# Patient Record
Sex: MALE | Race: WHITE
[De-identification: names, ages, dates, MRNs, and addresses within clinical notes are randomized per-mention and may not be internally consistent; named-entity substitution may affect disease eponyms.]

---

## 2019-02-21 ENCOUNTER — HOSPITAL ENCOUNTER (OUTPATIENT)
Dept: HOSPITAL 62 - SC | Age: 74
Discharge: HOME | End: 2019-02-21
Attending: OPHTHALMOLOGY
Payer: MEDICARE

## 2019-02-21 DIAGNOSIS — Z79.82: ICD-10-CM

## 2019-02-21 DIAGNOSIS — E78.00: ICD-10-CM

## 2019-02-21 DIAGNOSIS — H25.813: Primary | ICD-10-CM

## 2019-02-21 DIAGNOSIS — H40.033: ICD-10-CM

## 2019-02-21 DIAGNOSIS — H57.03: ICD-10-CM

## 2019-02-21 DIAGNOSIS — I10: ICD-10-CM

## 2019-02-21 DIAGNOSIS — H52.4: ICD-10-CM

## 2019-02-21 DIAGNOSIS — H35.3131: ICD-10-CM

## 2019-02-21 PROCEDURE — 66984 XCAPSL CTRC RMVL W/O ECP: CPT

## 2019-02-21 PROCEDURE — V2632 POST CHMBR INTRAOCULAR LENS: HCPCS

## 2019-02-21 RX ADMIN — CYCLOPENTOLATE HYDROCHLORIDE AND PHENYLEPHRINE HYDROCHLORIDE PRN DROP: 2; 10 SOLUTION/ DROPS OPHTHALMIC at 07:12

## 2019-02-21 RX ADMIN — TETRACAINE HYDROCHLORIDE PRN DROP: 5 SOLUTION OPHTHALMIC at 07:26

## 2019-02-21 RX ADMIN — DORZOLAMIDE HYDROCHLORIDE AND TIMOLOL MALEATE PRN DROP: 20; 5 SOLUTION OPHTHALMIC at 07:57

## 2019-02-21 RX ADMIN — TETRACAINE HYDROCHLORIDE PRN DROP: 5 SOLUTION OPHTHALMIC at 07:31

## 2019-02-21 RX ADMIN — TROPICAMIDE PRN DROP: 10 SOLUTION/ DROPS OPHTHALMIC at 07:21

## 2019-02-21 RX ADMIN — BESIFLOXACIN PRN DROP: 6 SUSPENSION OPHTHALMIC at 00:00

## 2019-02-21 RX ADMIN — TROPICAMIDE PRN DROP: 10 SOLUTION/ DROPS OPHTHALMIC at 07:12

## 2019-02-21 RX ADMIN — CYCLOPENTOLATE HYDROCHLORIDE AND PHENYLEPHRINE HYDROCHLORIDE PRN DROP: 2; 10 SOLUTION/ DROPS OPHTHALMIC at 07:06

## 2019-02-21 RX ADMIN — CYCLOPENTOLATE HYDROCHLORIDE AND PHENYLEPHRINE HYDROCHLORIDE PRN DROP: 2; 10 SOLUTION/ DROPS OPHTHALMIC at 07:21

## 2019-02-21 RX ADMIN — BESIFLOXACIN PRN DROP: 6 SUSPENSION OPHTHALMIC at 07:57

## 2019-02-21 RX ADMIN — EPINEPHRINE ONE MG: 1 INJECTION, SOLUTION, CONCENTRATE INTRAVENOUS at 00:00

## 2019-02-21 RX ADMIN — TROPICAMIDE PRN DROP: 10 SOLUTION/ DROPS OPHTHALMIC at 07:06

## 2019-02-21 RX ADMIN — SODIUM CHONDROITIN SULFATE / SODIUM HYALURONATE ONE EACH: 0.55-0.5 INJECTION INTRAOCULAR at 00:00

## 2019-02-21 RX ADMIN — EPINEPHRINE ONE MG: 1 INJECTION, SOLUTION, CONCENTRATE INTRAVENOUS at 07:43

## 2019-02-21 RX ADMIN — DORZOLAMIDE HYDROCHLORIDE AND TIMOLOL MALEATE PRN DROP: 20; 5 SOLUTION OPHTHALMIC at 00:00

## 2019-02-21 RX ADMIN — TETRACAINE HYDROCHLORIDE PRN DROP: 5 SOLUTION OPHTHALMIC at 07:07

## 2019-02-21 RX ADMIN — BESIFLOXACIN PRN DROP: 6 SUSPENSION OPHTHALMIC at 07:22

## 2019-02-21 RX ADMIN — BESIFLOXACIN PRN DROP: 6 SUSPENSION OPHTHALMIC at 07:07

## 2019-02-21 RX ADMIN — SODIUM CHONDROITIN SULFATE / SODIUM HYALURONATE ONE EACH: 0.55-0.5 INJECTION INTRAOCULAR at 07:43

## 2019-02-21 NOTE — [facility] DISCHARGE SUMMARY E
[facility] Discharge Summary



NAME: ELIZABETH CAMACHO

                                      MRN: [id number]

                                AGE: 73Y

ADMITTED: 02/21/2019           DISCHARGED: 02/21/2019





HOSPITAL COURSE:

This is a 73-year-old male who underwent cataract extraction of the left

eye.



DIAGNOSIS:

CATARACT, LEFT EYE.



He underwent surgery because he was having difficulty seeing words on the

television.



DISCHARGE INSTRUCTIONS:

He should be on a regular diet.  No bending at his waist, no heavy lifting.

He should use Besivance, PROLENSA, and Durezol at 3 p.m. and 8 p.m. and

sleep with a rigid shield.  I will see him for his 1 day postoperative

tomorrow.





DICTATING PHYSICIAN: ROMELIA HOLLIS M.D.



[id number]              DT: 02/21/2019 2016

PHY#: 2011         DD: 02/21/2019 1925

ID:   [id number]               JOB#: [id number]       ACCT: [id number]



cc:ROMELIA HOLLIS M.D.

>

## 2019-02-21 NOTE — [facility] OPERATIVE REPORT E
[facility] Operative Report



NAME: ELIZABETH CAMACHO

                                      MRN: [id number]

                             AGE: 73Y

DATE OF SURGERY: 02/21/2019                   ROOM:





 

PREOPERATIVE DIAGNOSIS:

CATARACT, LEFT EYE.



POSTOPERATIVE DIAGNOSIS:

CATARACT, LEFT EYE.



OPERATION:

Cataract extraction with insertion of an IOL of the left eye.



SURGEON:

ROMELIA HOLLIS M.D.



ANESTHESIA:

Topical.



PROCEDURE:

After obtaining appropriate consent, the patient's left eye was prepped and

draped in sterile fashion as well as the surgeon in a sterile manner and

cataract surgery was started.  First a paracentesis blade was used to make

a side-port incision.  Viscoelastic was used to inflate the anterior

chamber.  Next a 2.4 mm incision was made with a 2.4 mm blade, clear

corneal temporally.  A continuous capsulorrhexis was made using a cystotome

and Utrata forceps.  Following this hydrodissection was carried out to make

the lens fully loose and mobile and it was rotated 90 degrees.  Following

this, a divide-and-conquer technique was used to phacoemulsify the lens

with a CDE of 15.80. The remaining cortex was removed with

irrigation/aspiration.  Provisc was instilled into the capsular bag to

inflate the bag.  A SN60WF, 24.0 diopter lens was placed.  The remaining

viscoelastic material was removed with irrigation/aspiration.  Following

this, the incision was found to be watertight.  Besivance was instilled

into the eye and a protective shield was placed over the eye.   The patient

returned to the postoperative recovery in stable condition.





DICTATING PHYSICIAN: ROMELIA HOLLIS M.D.



5020M              DT: 02/21/2019 2015

PHY#: 2011         DD: 02/21/2019 1925

ID:   [id number]               JOB#: [id number]       ACCT: [id number]



cc:ROMELIA HOLLIS M.D.

>

## 2019-07-20 ENCOUNTER — HOSPITAL ENCOUNTER (EMERGENCY)
Dept: HOSPITAL 62 - ER | Age: 74
Discharge: HOME | End: 2019-07-20
Payer: MEDICARE

## 2019-07-20 VITALS — DIASTOLIC BLOOD PRESSURE: 80 MMHG | SYSTOLIC BLOOD PRESSURE: 155 MMHG
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
## 2019-07-20 DIAGNOSIS — F17.299: ICD-10-CM

## 2019-07-20 DIAGNOSIS — S50.02XA: Primary | ICD-10-CM

## 2019-07-20 DIAGNOSIS — I10: ICD-10-CM

## 2019-07-20 DIAGNOSIS — M25.522: ICD-10-CM

## 2019-07-20 PROCEDURE — 80053 COMPREHEN METABOLIC PANEL: CPT

## 2019-07-20 PROCEDURE — 85379 FIBRIN DEGRADATION QUANT: CPT

## 2019-07-20 PROCEDURE — 99284 EMERGENCY DEPT VISIT MOD MDM: CPT

## 2019-07-20 PROCEDURE — 85025 COMPLETE CBC W/AUTO DIFF WBC: CPT

## 2019-07-20 PROCEDURE — 93971 EXTREMITY STUDY: CPT

## 2019-07-20 PROCEDURE — 85610 PROTHROMBIN TIME: CPT

## 2019-07-20 NOTE — ER DOCUMENT REPORT
ED Extremity Problem, Upper





- General


Chief Complaint: Arm Problem


Stated Complaint: ARM PAIN


Time Seen by Provider: 07/20/19 17:14


Primary Care Provider: 


MIN KURTZ MD [Primary Care Provider] - Follow up as needed


Notes: 





73-year-old mal with no significant past medical history other than hypertension

presents to the emergency department after being sent over from his primary care

provider for abnormal lab.  Patient was seen at [facility] this morning and 

had an elevated d-dimer of 1.52.  Patient has significant ecchymosis over the 

anterior lateral aspect of his left forearm.  Patient said he did have some 

swelling of his left elbow prior to that.  His wife said that she could just see

there is just weeping out as the swelling reduced in his elbow.  He denies any 

fevers or recent illness, denies any unilateral arm swelling, denies any acute 

shortness of breath or chest pain, denies any unilateral or bilateral lower 

extremity edema.  No other complaints


TRAVEL OUTSIDE OF THE [place] IN LAST 30 DAYS: No





- Related Data


Allergies/Adverse Reactions: 


                                        





No Known Allergies Allergy (Verified 07/20/19 17:00)


   











Past Medical History





- Social History


Smoking Status: Unknown if Ever Smoked


Chew tobacco use (# tins/day): Yes - FORMER


Frequency of alcohol use: None


Drug Abuse: None


Family History: None


Patient has suicidal ideation: No


Patient has homicidal ideation: No





- Past Medical History


Cardiac Medical History: Reports: Hx Hypertension


   Denies: Hx Heart Attack


Pulmonary Medical History: 


   Denies: Hx Asthma


Neurological Medical History: Denies: Hx Cerebrovascular Accident, Hx Seizures


Renal/ Medical History: Denies: Hx Peritoneal Dialysis


GI Medical History: Reports: Hx Ulcer.  Denies: Hx Hepatitis, Hx Hiatal Hernia


Infectious Medical History: Denies: Hx Hepatitis


Past Surgical History: Denies: Hx Open Heart Surgery, Hx Pacemaker





Review of Systems





- Review of Systems


Constitutional: See HPI


EENT: No symptoms reported


Cardiovascular: See HPI


Respiratory: See HPI


Gastrointestinal: No symptoms reported


Genitourinary: No symptoms reported


Male Genitourinary: No symptoms reported


Musculoskeletal: See HPI


Skin: See HPI


Hematologic/Lymphatic: No symptoms reported


Neurological/Psychological: No symptoms reported





Physical Exam





- Vital signs


Vitals: 


                                        











Temp Pulse Resp BP Pulse Ox


 


 98.0 F   77   18   164/82 H  94 


 


 07/20/19 17:03  07/20/19 17:03  07/20/19 17:03  07/20/19 17:03  07/20/19 17:03














- Notes


Notes: 


PHYSICAL EXAMINATION:





Reviewed vital signs and charting by RN





GENERAL: Alert, interacts well. No acute distress.


HEAD: Normocephalic, atraumatic.


EYES: Pupils equal and round. Extraocular movements intact.


ENT: Oral mucosa moist, tongue midline. 


NECK: Full range of motion. Trachea midline.


LUNGS: Clear to auscultation bilaterally, no wheezes, rales, or rhonchi. No 

respiratory distress.


HEART: Regular rate and rhythm. No murmur


ABDOMEN: soft, non-tender.  No distention. Bowel sounds present


EXTREMITIES: Moves all 4 extremities spontaneously. No edema,  No cyanosis.


PSYCH: Normal affect, normal mood.


SKIN: Warm, dry, significant ecchymosis over the distal left upper extremity 

that covers about 75% of the area mostly on the anterior lateral aspect





Course





- Re-evaluation


Re-evalutation: 





07/20/19 17:31


Patient with an elevated d-dimer after being seen at the primary provider's 

office and sent over to rule out DVT.


07/20/19 18:14


Wells score -2 as patient has no risk factors at this time for DVT.  He has a 

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


07/20/19 18:56


Venous Doppler negative for DVT, technician commented that there is a large 

fluid pocket with possibly some blood flow to it and thought may be a Doppler 

might be in order.  I then discussed the case with Dr. Kenney who examined the 

patient and felt that he has a hematoma that is slowly resolving.  Out of an 

abundance of caution a left elbow x-ray was ordered to ensure there is no occult

fracture.  Pending x-ray patient will be stable for discharge.


07/21/19 15:05


X-ray was negative for occult fracture.  Patient is stable for discharge.





- Vital Signs


Vital signs: 


                                        











Temp Pulse Resp BP Pulse Ox


 


 98.0 F   75   15   155/80 H  93 


 


 07/20/19 20:07  07/20/19 20:07  07/20/19 20:07  07/20/19 20:07  07/20/19 20:07














Discharge





- Discharge


Clinical Impression: 


 Hematoma





Condition: Good


Disposition: HOME, SELF-CARE


Additional Instructions: 


You were seen in the emergency department this afternoon for what is most likely

a large hematoma that was in your elbow and ended up spreading to your arm.  The

venous Doppler that was done was negative for DVT or anything dangerous.  The 

bruising will last for several weeks and will take some time to resorb.  You can

help the process by elevating her arm while you are sitting down and relaxing.  

If your elbow starts to swell up, you get severe pain to the elbow, you develop 

acute chest pain or shortness of breath, or you have any other concerning 

symptoms please merely return to the emergency department.


Referrals: 


MIN KURTZ MD [Primary Care Provider] - Follow up as needed

## 2019-07-20 NOTE — RADIOLOGY REPORT (SQ)
EXAM DESCRIPTION:  ELBOW LEFT OVER 2 VIEWS



COMPLETED DATE/TIME:  7/20/2019 7:16 pm



REASON FOR STUDY:  ecchymosis, eval for fracture



COMPARISON:  None.



NUMBER OF VIEWS:  Four views.



TECHNIQUE:  AP, lateral, and both oblique radiographic images acquired of the left elbow.



LIMITATIONS:  None.



FINDINGS:  MINERALIZATION: Normal.

BONES: Small traction osteophyte medial epicondyle.

JOINT: No effusion.

SOFT TISSUES: Soft tissue prominence posterior to the elbow joint.

OTHER: No other significant finding.



IMPRESSION:  No fracture seen.



TECHNICAL DOCUMENTATION:  JOB ID:  [id number]

[id number]

 2011 [employer]- All Rights Reserved



Reading location - IP/workstation name: [email or web address]

## 2019-07-21 NOTE — [facility] REPORT
[address] [address]

                               Tel: [phone]

                               Fax: [phone]



                       Upper Extremity Venous Evaluation

_______________________________________________________________________________



Name: ELIZABETH CAMACHO

MRN: [id number]                                Age: 73 yrs

Gender: Male                                   : 1945

Patient Status: Emergency                      Patient Location: ER

Account #: [id number]

Study Date: 2019 06:03 PM

Accession #: [id number]

_______________________________________________________________________________

Procedure: Unilateral duplex scan of the left upper extremity veins was

performed, including responses to compression and other maneuvers.



Reason For Study: elevated d-dimer





Ordering Physician: BRITTANY WOODRUFF

Performed By: Lion Hopkins

_______________________________________________________________________________

_______________________________________________________________________________







Left Sided Venous Evaluation

A large complex, non vascular mass in the left elbow area. is seen.

Normal vessel filling wall to wall, compression and augmentation as well as

Colour flow down to the forearm veins.

_______________________________________________________________________________

Interpretation Summary

Normal compression, patency, spontaneous and phasic flow of the left upper

extremity veins. Incidental finding of a large non vascular mass, possible

hematoma or abscess.

_______________________________________________________________________________



_______________________________________________________________________________

Electronically signed by:      Lennox Williams      on 2019 11:32 AM



CC: BRITTANY WOODRUFF

>

Williams, Lennox